# Patient Record
Sex: FEMALE | Race: WHITE
[De-identification: names, ages, dates, MRNs, and addresses within clinical notes are randomized per-mention and may not be internally consistent; named-entity substitution may affect disease eponyms.]

---

## 2017-06-23 ENCOUNTER — HOSPITAL ENCOUNTER (OUTPATIENT)
Dept: HOSPITAL 53 - M PCU | Age: 52
Setting detail: OBSERVATION
LOS: 2 days | Discharge: LEFT BEFORE DISCHARGE | End: 2017-06-25
Attending: HOSPITALIST | Admitting: INTERNAL MEDICINE
Payer: MEDICAID

## 2017-06-23 VITALS — HEIGHT: 62 IN | BODY MASS INDEX: 40.45 KG/M2 | WEIGHT: 219.8 LBS

## 2017-06-23 VITALS — DIASTOLIC BLOOD PRESSURE: 64 MMHG | SYSTOLIC BLOOD PRESSURE: 136 MMHG

## 2017-06-23 DIAGNOSIS — Z88.8: ICD-10-CM

## 2017-06-23 DIAGNOSIS — Z88.2: ICD-10-CM

## 2017-06-23 DIAGNOSIS — I25.10: ICD-10-CM

## 2017-06-23 DIAGNOSIS — I10: ICD-10-CM

## 2017-06-23 DIAGNOSIS — F32.9: ICD-10-CM

## 2017-06-23 DIAGNOSIS — Z79.02: ICD-10-CM

## 2017-06-23 DIAGNOSIS — G45.9: Primary | ICD-10-CM

## 2017-06-23 DIAGNOSIS — Z98.61: ICD-10-CM

## 2017-06-23 DIAGNOSIS — Z79.899: ICD-10-CM

## 2017-06-23 DIAGNOSIS — Z79.82: ICD-10-CM

## 2017-06-23 RX ADMIN — GABAPENTIN SCH MG: 100 CAPSULE ORAL at 21:00

## 2017-06-23 RX ADMIN — PENTOSAN POLYSULFATE SODIUM SCH MG: 100 CAPSULE, GELATIN COATED ORAL at 21:00

## 2017-06-24 VITALS — SYSTOLIC BLOOD PRESSURE: 122 MMHG | DIASTOLIC BLOOD PRESSURE: 67 MMHG

## 2017-06-24 VITALS — DIASTOLIC BLOOD PRESSURE: 58 MMHG | SYSTOLIC BLOOD PRESSURE: 120 MMHG

## 2017-06-24 VITALS — SYSTOLIC BLOOD PRESSURE: 126 MMHG | DIASTOLIC BLOOD PRESSURE: 56 MMHG

## 2017-06-24 VITALS — DIASTOLIC BLOOD PRESSURE: 65 MMHG | SYSTOLIC BLOOD PRESSURE: 138 MMHG

## 2017-06-24 VITALS — SYSTOLIC BLOOD PRESSURE: 136 MMHG | DIASTOLIC BLOOD PRESSURE: 69 MMHG

## 2017-06-24 VITALS — DIASTOLIC BLOOD PRESSURE: 53 MMHG | SYSTOLIC BLOOD PRESSURE: 109 MMHG

## 2017-06-24 RX ADMIN — NICOTINE SCH PATCH: 14 PATCH, EXTENDED RELEASE TRANSDERMAL at 11:05

## 2017-06-24 RX ADMIN — CLOPIDOGREL BISULFATE SCH MG: 75 TABLET ORAL at 11:02

## 2017-06-24 RX ADMIN — SODIUM CHLORIDE SCH MLS/HR: 9 INJECTION, SOLUTION INTRAVENOUS at 12:25

## 2017-06-24 RX ADMIN — PENTOSAN POLYSULFATE SODIUM SCH MG: 100 CAPSULE, GELATIN COATED ORAL at 16:42

## 2017-06-24 RX ADMIN — ATORVASTATIN CALCIUM SCH MG: 20 TABLET, FILM COATED ORAL at 11:05

## 2017-06-24 RX ADMIN — PENTOSAN POLYSULFATE SODIUM SCH MG: 100 CAPSULE, GELATIN COATED ORAL at 20:45

## 2017-06-24 RX ADMIN — GABAPENTIN SCH MG: 100 CAPSULE ORAL at 20:45

## 2017-06-24 RX ADMIN — VENLAFAXINE HYDROCHLORIDE SCH MG: 75 CAPSULE, EXTENDED RELEASE ORAL at 11:20

## 2017-06-24 RX ADMIN — ASPIRIN SCH MG: 81 TABLET ORAL at 11:03

## 2017-06-24 RX ADMIN — GABAPENTIN SCH MG: 100 CAPSULE ORAL at 16:42

## 2017-06-24 RX ADMIN — PENTOSAN POLYSULFATE SODIUM SCH MG: 100 CAPSULE, GELATIN COATED ORAL at 11:03

## 2017-06-24 RX ADMIN — SODIUM CHLORIDE SCH MLS/HR: 9 INJECTION, SOLUTION INTRAVENOUS at 01:42

## 2017-06-24 RX ADMIN — OMEPRAZOLE SCH MG: 20 CAPSULE, DELAYED RELEASE ORAL at 11:05

## 2017-06-24 RX ADMIN — GABAPENTIN SCH MG: 100 CAPSULE ORAL at 11:03

## 2017-06-24 NOTE — REP
CAROTID DUPLEX ULTRASOUND:  06/24/2017

 

CLINICAL HISTORY:  TIA.

 

FINDINGS:  No comparison study.  Standard duplex techniques were utilized.  The

right common carotid shows some mild intimal thickening with scattered soft and

calcific plaque at the bulb extending to the proximal ICA.  The left internal

carotid artery also shows some intimal thickening with distal CCA soft and

calcific plaque and into the bulb and proximal ICA.

 

Peak Velocities:

 

                                      RIGHT        LEFT

 

CCA systolic            0.88 m/s         0.93 m/s

 

ICA systolic              0.93 m/s        0.84 m/s

 

ICA diastolic            0.39 m/s         0.41 m/s

 

ECA systolic             0.82 m/s         0.96 m/s

 

IC/CC ratio              1.05                  0.91

 

Cranial direction of flow seen in the vertebral arteries bilaterally.  The

Doppler waveform analysis does not show significant spectral broadening or

filling in of the systolic window for either side.

 

IMPRESSION:

 

1.  Less than 50% stenosis of the bilateral internal carotids by Doppler

ultrasound criteria.  No hemodynamically significant or flow restricting lesion.

 

2.  Cranial direction of flow in the vertebral arteries.

 

 

Signed by

Rudolph Johnson MD 06/24/2017 07:53 P

## 2017-06-24 NOTE — HPEPDOC
General


Date of Admission


Jun 23, 2017 at 22:35


Other Providers


Joseph Anthony


Attending Physician:  ALYSA POLLOCK DO


Chief Complaint


The patient is a 52-year-old female admitted with a reason for visit of TIA.


Source:  Patient


Exam Limitations:  No limitations


Timing/Duration:  Momentarily, Intermittent, Resolved prior to arrival, This 

afternoon


Severity:  Mild


Associated Symptoms:  Weakness





History of Present Illness


52-year-old female, history of CAD status post Stents twice CVA with no 

residual dysfunction, hypertensive, presented with sudden onset of left upper 

and lower extremity weakness. Patient was walking and all of a sudden she 

experienced weakness in left lower extremity. She was about to fall, but she 

hold the wall and then she experienced the left upper extremity weakness. This 

episode happened twice, first episode lasting for a few seconds and the second 

episode lasting about 4-5 minutes. Patient went to the emergency room of 

[facility]. A CT head, EKG, chest x-ray, troponin and physical 

examination was unremarkable.. Patient was sent to [facility] for 

overnight monitoring and neurologic consult if necessary.





Home Medications


Scheduled


Amlodipine Besylate (Norvasc) 2.5 Mg Tab, 2.5 MG PO DAILY, (Reported)


Aspirin (Aspir-Low) 81 Mg Tab, 81 MG PO DAILY, (Reported)


Atorvastatin Calcium (Atorvastatin Calcium) 80 Mg Tab, 80 MG PO DAILY, (Reported

)


Clopidogrel Bisulfate (Clopidogrel) 75 Mg Tab, 75 MG PO DAILY, (Reported)


Gabapentin (Gabapentin) 100 Mg Cap, 100 MG PO TID, (Reported)


Hydroxyzine HCl (Hydroxyzine HCl) 25 Mg Tab, 25 MG PO DAILY, (Reported)


Omeprazole (Omeprazole) 20 Mg Tab, 20 MG PO DAILY, (Reported)


Pentosan Polysulfate Sodium (Elmiron) 100 Mg Cap, 100 MG PO TID, (Reported)


Trazodone HCl (Trazodone HCl) 50 Mg Tab, 50 MG PO QHS, (Reported)


Venlafaxine HCl (Venlafaxine HCl ER) 75 Mg Capcr, 75 MG PO DAILY, (Reported)





Allergies


Coded Allergies:  


     Cephalexin (Unverified  Allergy, Intermediate, 6/23/17)


     Codeine (Unverified  Allergy, Intermediate, 6/23/17)


     Sulfa Antibiotics (Unverified  Allergy, Intermediate, 6/23/17)





Past Medical History


Medical History


CAD, CVA, hypertensive


Surgical History


Cardiac cath hernia repair





Family History


Significant Family History:  No pertinent family hx





Social History


* Smoker:  less than 1 pack/day


Alcohol:  Denies


Drugs:  marijuana


Recent Travel/Sick Contacts:  Denies: Recent travel, Recent sick contacts


Psychosocial History:  No pertinent psych hx





Review of Symptoms


Constitutional:  Reports: Weakness, 


   Denies: Chills, Fever, Night Sweats


Eyes:  Denies: Pain, Vision change


ENT:  Denies: Head Aches, Ear Pain, Dysphagia


Skin:  Denies: Rash, Lesions, Breakdown


Pulmonary:  Denies: Dyspnea, Cough


Cardiovascular:  Denies: Chest Pain, Palpitations, Orthopnea, Paroxysmal Noc. 

Dyspnea, Lt Headedness


Gastrointestinal:  Denies: Nausea, Vomiting, Abdominal Pain, Diarrhea


Genitourinary:  Denies: Dysuria, Frequency, Incontinence, Retention


Hematologic:  Denies: Bruising, Bleeding Excessively


Musculoskeletal:  Denies: Neck Pain, Back Pain, Joint Pain, Muscle Pain, Spasms


Neurological:  Reports: Weakness, Numbness, 


   Denies: Change in speech, Confusion


Psych:  Reports: Mood Normal, 


   Denies: Depression, Memory Issues





Physical Examination


General Exam:  Positive: Alert, No Acute Distress


Eye Exam:  Positive: PERRLA, Conjunctiva & lids normal, EOMI, 


   Negative: Sclera icteric


ENT Exam:  Positive: Atraumatic, Mucous membr. moist/pink, Pharynx Normal


Neck Exam:  Positive: Supple, 


   Negative: JVD, thyromegaly


Chest Exam:  Positive: Clear to auscultation, Normal air movement


Heart Exam:  Positive: Rate Normal, Regular Rhythm, Normal S1, Normal S2, 


   Negative: Murmurs, Rubs


Telemetry:  Positive: No significant arrhythmia


Abdomen Exam:  Positive: Normal bowel sounds, Soft, 


   Negative: Tenderness, Hepatospenomegaly


Extremity Exam:  Positive: Normal pulses, 


   Negative: Clubbing, Cyanosis, Edema


Skin Exam:  Positive: Nl turgor and temperature, 


   Negative: Breakdown, Lesion


Neuro Exam:  Positive: Normal Gait, Normal Speech, Cranial Nerves 3-12 NL, 

Reflexes 2+


Psych Exam:  Positive: Mental status NL, Mood NL, Oriented x 3





Vital Signs





Vital Signs








  Date Time  Temp Pulse Resp B/P (MAP) Pulse Ox O2 Delivery O2 Flow Rate FiO2


 


6/23/17 22:35 96.4 74 20 136/64 (88) 100 Room Air  











 Assessment/Plan


52-year-old female, history of CAD, CVA, hypertension, presented with left 

upper and lower extremity weakness. CT head, EKG, chest x-ray, troponin was 

unremarkable





Problems





(1) TIA (transient ischemic attack)


Problem Text:  CT head unremarkable. Get carotid Doppler, MRI brain and 

neurological checks. PT, OT





(2) Tobacco abuse


Problem Text:  Daily nicotine patch





(3) Depression


Problem Text:  Continue with the Effexor





(4) Hypertension


Problem Text:  Continue with Norvasc





(5) CAD (coronary artery disease)


Problem Text:  Continue with aspirin, Plavix, Lipitor








Plan / VTE


VTE Prophylaxis Ordered?:  Yes





Plan


Diet:  Continue Current


Activity:  Continue Current


Therapy:  PT


Anticipated Discharge:  Home











GEORGIANA STRANGE MD Jun 24, 2017 02:37

## 2017-06-24 NOTE — IPNPDOC
Subjective


Date Seen


The patient was seen on 6/24/17.





Subjective


Chief Complaint/HPI


The patient is a 52-year-old female admitted with a reason for visit of TIA.


Constitutional:  Denies: Chills, Fever, Night Sweats


Cardiovascular:  Denies: Chest Pain, Palpitations, Orthopnea, Paroxysmal Noc. 

Dyspnea, Lt Headedness





Objective


Physical Examination


General Exam:  Positive: Alert, No Acute Distress


Eye Exam:  Positive: PERRLA, Conjunctiva & lids normal, EOMI, 


   Negative: Sclera icteric


ENT Exam:  Positive: Atraumatic, Mucous membr. moist/pink, Pharynx Normal


Neck Exam:  Positive: Supple, 


   Negative: JVD, thyromegaly


Chest Exam:  Positive: Clear to auscultation, Normal air movement


Heart Exam:  Positive: Rate Normal, Regular Rhythm, Normal S1, Normal S2, 


   Negative: Murmurs, Rubs


Telemetry:  Positive: No significant arrhythmia


Abdomen Exam:  Positive: Normal bowel sounds, Soft, 


   Negative: Tenderness, Hepatospenomegaly


Extremity Exam:  Positive: Normal pulses, 


   Negative: Clubbing, Cyanosis, Edema


Skin Exam:  Positive: Nl turgor and temperature, 


   Negative: Breakdown, Lesion


Neuro Exam:  Positive: Normal Gait, Normal Speech, Cranial Nerves 3-12 NL, 

Reflexes 2+


Psych Exam:  Positive: Mental status NL, Mood NL, Oriented x 3





Assessment /Plan


Problems





(1) TIA (transient ischemic attack)


Problem Text:  


* CT head unremarkable. Get carotid Doppler less than 50% stenosis, MRI brain 

showed old left lacunar infarct


* neurological checks. PT, OT


* echo pending


* continue to monitor on tele





(2) Tobacco abuse


Problem Text:  Daily nicotine patch





(3) Depression


Problem Text:  Continue with the Effexor





(4) Hypertension


Problem Text:  Continue with Norvasc





(5) CAD (coronary artery disease)


Problem Text:  Continue with aspirin, Plavix, Lipitor








Plan/VTE


VTE Prophylaxis Ordered?:  Yes





Plan


Diet:  Continue Current


Activity:  Continue Current


Therapy:  PT


Anticipated Discharge:  Home





VS, I&O, 24H, Fishbone


Vital Signs/I&O





Vital Signs








  Date Time  Temp Pulse Resp B/P (MAP) Pulse Ox O2 Delivery O2 Flow Rate FiO2


 


6/24/17 11:50 98.9 71 20 126/56 (79) 98 Room Air  














I&O- Last 24 Hours up to 6 AM 


 


 6/24/17





 06:00


 


Intake Total 400 ml


 


Output Total 0 ml


 


Balance 400 ml











Laboratory Data


24H LABS


Laboratory Tests 2


6/24/17 07:14: 


Total Creatine Kinase 57, Creatine Kinase MB 1.0, Creatine Kinase MB Relative 

Index 1.75, Troponin I 0.11H











ALYSA POLLOCK DO Jun 24, 2017 15:15

## 2017-06-24 NOTE — REP
MRI BRAIN WITHOUT CONTRAST:  06/24/2017

 

COMPARISON:  MRI/MRA brain 10/18/2015, carotid ultrasound 06/24/2017.

 

CLINICAL HISTORY:  TIA.  Left-sided extremity numbness.

 

FINDINGS:  Sagittal T1 with axial T1, T2 FLAIR gradient-echo diffusion weighted

images and ADC mapping sequences provided.

 

FINDINGS:  Ventricles are midline and symmetric and without dilatation.  Third

and fourth ventricles also intact.  Basal ganglia symmetric and preserved.  There

are periventricular, deep central and subcortical white matter hyperintense T2

and FLAIR foci suggesting some small vessel white matter ischemic change.  I see

no intra or extra-axial hemorrhage, mass, mass effect or edema.  No extra-axial

fluid collections.  The diffusion weighted images and ADC mapping sequences

demonstrate no evidence of restricted water diffusion or acute ischemia.

Brainstem and cerebellum are intact.  There is a tiny cystic area or old lacunar

infarct in the left cerebellar hemisphere unchanged.  Basal cisterns are

adequate.  The seventh/eighth cranial nerve complexes are intact.  There is some

increased signal on T2 images and posterior lateral mastoids. This may reflect

some chronic mild mastoiditis.  Left frontal sinus and the frontoethmoid recess

shows mucosal thickening with minimal mucosal thickening in some of the ethmoid

air cells. The sphenoid sinuses clear.  Minor mucosal thickening in the right

maxillary sinus.  No air-fluid levels.  Corpus callosum, optic chiasm and

pituitary are normal.  There is no cerebellar tonsillar ectopia on the sagittal

images.

 

IMPRESSION:

 

1.  Some chronic small vessel white matter ischemic changes without acute

infarct, hemorrhage, mass or mass effect. Old lacunar infarct suggested left

cerebellar hemisphere with tiny cystic area stable.

 

2.  Midline structures unremarkable.

 

3.  Some minor bilateral mastoid opacity, chronic.

 

4.  There is no ventriculomegaly or atrophy.

 

 

 

 

 

 

Signed by

Rudolph Johnson MD 06/24/2017 07:57 P

## 2017-06-25 VITALS — SYSTOLIC BLOOD PRESSURE: 126 MMHG | DIASTOLIC BLOOD PRESSURE: 77 MMHG

## 2017-06-25 VITALS — DIASTOLIC BLOOD PRESSURE: 77 MMHG | SYSTOLIC BLOOD PRESSURE: 126 MMHG

## 2017-06-25 VITALS — SYSTOLIC BLOOD PRESSURE: 106 MMHG | DIASTOLIC BLOOD PRESSURE: 57 MMHG

## 2017-06-25 LAB
ALBUMIN SERPL BCG-MCNC: 2.9 GM/DL (ref 3.2–5.2)
ALBUMIN/GLOB SERPL: 0.91 {RATIO} (ref 1–1.93)
ALP SERPL-CCNC: 79 U/L (ref 45–117)
ALT SERPL W P-5'-P-CCNC: 14 U/L (ref 12–78)
ANION GAP SERPL CALC-SCNC: 7 MEQ/L (ref 8–16)
AST SERPL-CCNC: 7 U/L (ref 15–37)
BILIRUB SERPL-MCNC: 0.2 MG/DL (ref 0.2–1)
BUN SERPL-MCNC: 9 MG/DL (ref 7–18)
CALCIUM SERPL-MCNC: 8.6 MG/DL (ref 8.5–10.1)
CHLORIDE SERPL-SCNC: 110 MEQ/L (ref 98–107)
CO2 SERPL-SCNC: 24 MEQ/L (ref 21–32)
CREAT SERPL-MCNC: 0.57 MG/DL (ref 0.55–1.02)
ERYTHROCYTE [DISTWIDTH] IN BLOOD BY AUTOMATED COUNT: 13.5 % (ref 11.5–14.5)
GFR SERPL CREATININE-BSD FRML MDRD: > 60 ML/MIN/{1.73_M2} (ref 51–?)
GLUCOSE SERPL-MCNC: 99 MG/DL (ref 70–105)
MCH RBC QN AUTO: 29.5 PG (ref 27–33)
MCHC RBC AUTO-ENTMCNC: 33.2 G/DL (ref 32–36.5)
MCV RBC AUTO: 88.8 FL (ref 80–96)
PLATELET # BLD AUTO: 219 K/MM3 (ref 150–450)
POTASSIUM SERPL-SCNC: 3.7 MEQ/L (ref 3.5–5.1)
PROT SERPL-MCNC: 6.1 GM/DL (ref 6.4–8.2)
SODIUM SERPL-SCNC: 141 MEQ/L (ref 136–145)
WBC # BLD AUTO: 7.5 K/MM3 (ref 4–10)

## 2017-06-25 RX ADMIN — PENTOSAN POLYSULFATE SODIUM SCH MG: 100 CAPSULE, GELATIN COATED ORAL at 08:31

## 2017-06-25 RX ADMIN — GABAPENTIN SCH MG: 100 CAPSULE ORAL at 08:31

## 2017-06-25 RX ADMIN — NICOTINE SCH PATCH: 14 PATCH, EXTENDED RELEASE TRANSDERMAL at 08:31

## 2017-06-25 RX ADMIN — VENLAFAXINE HYDROCHLORIDE SCH MG: 75 CAPSULE, EXTENDED RELEASE ORAL at 08:57

## 2017-06-25 RX ADMIN — OMEPRAZOLE SCH MG: 20 CAPSULE, DELAYED RELEASE ORAL at 08:32

## 2017-06-25 RX ADMIN — ATORVASTATIN CALCIUM SCH MG: 20 TABLET, FILM COATED ORAL at 08:32

## 2017-06-25 RX ADMIN — CLOPIDOGREL BISULFATE SCH MG: 75 TABLET ORAL at 08:31

## 2017-06-25 RX ADMIN — ASPIRIN SCH MG: 81 TABLET ORAL at 08:32

## 2017-06-25 NOTE — ECHO
DATE OF PROCEDURE:  06/25/2017

 

REFERRING PROVIDER:  Dr. Paddy Shirley

 

REASON FOR THE ECHOCARDIOGRAM: TIA.

 

2D MEASUREMENT:

IVS - 0.9 cm

LV - 5.3 cm

LVPW - 1.0 cm

LA - 3.6 cm

Aorta - 2.8 cm

IVC - 1.6 cm

 

DOPPLER MEASUREMENT:

Peak velocity across the aortic valve - 1.2 m/s

Peak velocity across the LVOT - 0.99 m/s

Mitral E - 1.0, Mitral A - 1.1 with a ratio of 0.9

 

2D COMMENTS:

1.  Technically limited study due to poor acoustic window secondary to lung

interference.

 

2.  Normal left ventricular size, wall thickness and normal global left

ventricular systolic function.  The estimated left ventricular systolic

ejection fraction is 60-65%.

 

3.  Normal left atrium. The right atrium and the right ventricle appear to be

normal in limited views.

 

4.  The atrial septum appear to be normal without evidence of defect or shunt.

 

5.  Normal aortic root.

 

6.  Trace pericardial effusion was noted, no evidence of cardiac tamponade.

 

7.  The aortic valve appeared to be normal in limited views.  Mildly calcified

mitral annulus with normal anterior mitral valve leaflet motion.  Normal

tricuspid valve.  The pulmonic valve and proximal pulmonary artery branches were

not well visualized.

 

8.  The inferior vena cava was normal in size, central venous pressure is

probably normal.

 

DOPPLER:

Only mild mitral regurgitation detected.  Abnormal relaxation patter was noted 
across

the mitral valve leaflets as well as the mitral valve annulus consistent with

grade 1 left ventricular diastolic dysfunction.

 

IMPRESSION:

1.  Technically limited study due to poor acoustic window.

 

2.  Normal global left ventricular systolic function with features of left

ventricular diastolic dysfunction, grade 1.

 

3.  Mitral annulus calcification with mild mitral regurgitation.

MTDD

## 2017-06-25 NOTE — DSES
DATE OF ADMISSION:  06/23/2017

DATE OF DISCHARGE:

 

PRINCIPAL DIAGNOSIS:  Transient ischemic attack (TIA), left-sided weakness.  The

patient leaving against medical advice.

 

HISTORY:  Josh Silvestre was admitted with a TIA.  She had left-sided weakness.  She

was admitted for therapy.  Details in history and physical from admission.

 

HOSPITAL COURSE:  The patient admitted to a progressive care unit (PCU) bed.  MRI

scan did not show any acute infarct.  An ultrasound showed less than 50% stenosis

bilaterally.  Echocardiogram has been ordered.  Results are pending.  I do not

have them back yet.  She said Dr. Romo was reading it, and he is her

cardiologist.

 

Plans were to continue her on telemetry for today.  She is saying she is going to

leave against medical advice and is insisting on discharge.  She has a history of

leaving against medical advice in the past.  She understands the risks of this:

Stroke, death, disability, and decreased quality of life.  Understands and

accepts this.

 

On examination today, her blood pressure (BP) is 126/77, pulse 72, respiratory

rate 18, 100% oxygen (O2) saturation on room air.

No facial droop or weakness.

Lungs clear.

Heart:  Regular rate and rhythm without murmur.

Abdomen:  Soft, nontender.

Normal coordination, strength, reflexes, and sensation on neurologic examination.

 

 

LABORATORIES:  CMP unremarkable.  CBC  normal.  Troponin times three were flat at

0.1.

 

DISPOSITION:  She will be discharged home on Norvasc 2.5 mg daily, aspirin 81 mg

daily, Lipitor 80 mg daily, Plavix 75 mg daily, omeprazole 20 mg daily, Effexor

XR 75 mg daily, nicotine 14 mg per day patch, Neurontin 100 mg three times a day,

Elmiron 100 mg three times a day, trazodone 50 mg nightly.  Activity as

tolerated.  No-added-salt diet.  Advised smoking cessation.  The risks of this

were also detailed.  She is to followup with her primary care provider next week.

She understands that she is leaving prior to anticipated duration of stay and

accepts the risks of this.

## 2019-02-11 ENCOUNTER — HOSPITAL ENCOUNTER (OUTPATIENT)
Dept: HOSPITAL 53 - M LAB REF | Age: 54
End: 2019-02-11
Attending: INTERNAL MEDICINE
Payer: COMMERCIAL

## 2019-02-11 DIAGNOSIS — R91.8: Primary | ICD-10-CM

## 2019-02-15 LAB
C-ANCA TITR SER IF: (no result) TITER
DSDNA AB SER-ACNC: 9 IU/ML (ref 0–9)
ENA RNP AB SER-ACNC: <0.2 AI (ref 0–0.9)
ENA SM AB SER-ACNC: <0.2 AI (ref 0–0.9)
ENA SS-A AB SER-ACNC: <0.2 AI (ref 0–0.9)
ENA SS-B AB SER-ACNC: <0.2 AI (ref 0–0.9)
P-ANCA ATYPICAL TITR SER IF: (no result) TITER
P-ANCA TITR SER IF: (no result) TITER